# Patient Record
Sex: MALE | Race: BLACK OR AFRICAN AMERICAN | NOT HISPANIC OR LATINO | Employment: FULL TIME | URBAN - METROPOLITAN AREA
[De-identification: names, ages, dates, MRNs, and addresses within clinical notes are randomized per-mention and may not be internally consistent; named-entity substitution may affect disease eponyms.]

---

## 2018-01-04 ENCOUNTER — HOSPITAL ENCOUNTER (EMERGENCY)
Facility: HOSPITAL | Age: 33
Discharge: HOME | End: 2018-01-04
Attending: EMERGENCY MEDICINE

## 2018-01-04 VITALS
RESPIRATION RATE: 18 BRPM | OXYGEN SATURATION: 98 % | HEIGHT: 74 IN | DIASTOLIC BLOOD PRESSURE: 61 MMHG | WEIGHT: 230 LBS | SYSTOLIC BLOOD PRESSURE: 136 MMHG | BODY MASS INDEX: 29.52 KG/M2 | TEMPERATURE: 99 F | HEART RATE: 81 BPM

## 2018-01-04 DIAGNOSIS — L02.91 ABSCESS: Primary | ICD-10-CM

## 2018-01-04 PROCEDURE — 10060 I&D ABSCESS SIMPLE/SINGLE: CPT

## 2018-01-04 PROCEDURE — 25000003 PHARM REV CODE 250: Performed by: PHYSICIAN ASSISTANT

## 2018-01-04 PROCEDURE — 99283 EMERGENCY DEPT VISIT LOW MDM: CPT | Mod: 25

## 2018-01-04 PROCEDURE — 99283 EMERGENCY DEPT VISIT LOW MDM: CPT | Mod: 25,,, | Performed by: PHYSICIAN ASSISTANT

## 2018-01-04 PROCEDURE — 10061 I&D ABSCESS COMP/MULTIPLE: CPT | Mod: ,,, | Performed by: PHYSICIAN ASSISTANT

## 2018-01-04 RX ORDER — DOXYCYCLINE 100 MG/1
100 CAPSULE ORAL 2 TIMES DAILY
Qty: 20 CAPSULE | Refills: 0 | Status: SHIPPED | OUTPATIENT
Start: 2018-01-04 | End: 2018-01-14

## 2018-01-04 RX ORDER — LIDOCAINE HYDROCHLORIDE 10 MG/ML
10 INJECTION INFILTRATION; PERINEURAL
Status: COMPLETED | OUTPATIENT
Start: 2018-01-04 | End: 2018-01-04

## 2018-01-04 RX ORDER — IBUPROFEN 600 MG/1
600 TABLET ORAL EVERY 6 HOURS PRN
Qty: 20 TABLET | Refills: 0 | Status: SHIPPED | OUTPATIENT
Start: 2018-01-04 | End: 2018-09-23

## 2018-01-04 RX ADMIN — LIDOCAINE HYDROCHLORIDE 10 ML: 10 INJECTION, SOLUTION INFILTRATION; PERINEURAL at 06:01

## 2018-01-04 NOTE — ED PROVIDER NOTES
Encounter Date: 2018    SCRIBE #1 NOTE: Leonora FONTANA , am scribing for, and in the presence of,  Dr. Ordoñez . I have scribed the entire note.       History     Chief Complaint   Patient presents with    Abscess     right axilla abscess x 4 days, denies drainage.      Time patient was seen by the provider: 6:13 AM    The patient is a 32 y.o. male with no pertinent medical history who presents to the ED with a complaint of right axilla abscess with onset 4 days ago. Patient denies drainage or fever. Patient has no other complaints at this time.      The history is provided by the patient and medical records.     Review of patient's allergies indicates:  No Known Allergies  History reviewed. No pertinent past medical history.  History reviewed. No pertinent surgical history.  History reviewed. No pertinent family history.  Social History   Substance Use Topics    Smoking status: Never Smoker    Smokeless tobacco: Never Used    Alcohol use No     Review of Systems   Constitutional: Negative for fever.   Skin:        Right axilla abscess.   Negative for drainage.        Physical Exam     Initial Vitals [18 0219]   BP Pulse Resp Temp SpO2   136/61 81 18 98.7 °F (37.1 °C) 98 %      MAP       86         Physical Exam    Nursing note and vitals reviewed.  Constitutional:   Nontoxic   Neck: Neck supple.   Skin:   4x2 cm abscess to the right axilla.         ED Course   I & D - Incision and Drainage  Date/Time: 2018 6:57 AM  Performed by: ALONZO AMBROCIO  Authorized by: DONNIE ORDOÑEZ   Consent Done: Yes  Consent: Verbal consent obtained.  Consent given by: patient  Patient identity confirmed:  and verbally with patient  Location: right axilla.  Anesthesia: local infiltration    Anesthesia:  Local Anesthetic: lidocaine 1% without epinephrine  Anesthetic total: 4 mL  Scalpel size: 11  Incision type: single straight  Complexity: complex  Drainage: pus,  bloody and  purulent  Drainage amount:  moderate  Wound treatment: incision,  deloculation,  wound packed and  expression of material  Packing material: 1/4 in gauze  Patient tolerance: Patient tolerated the procedure well with no immediate complications        Labs Reviewed - No data to display          Medical Decision Making:   History:   Old Medical Records: I decided to obtain old medical records.  Initial Assessment:   Patient with abscess. Will do I&D and discharge.             Scribe Attestation:   Scribe #1: I performed the above scribed service and the documentation accurately describes the services I performed. I attest to the accuracy of the note.            ED Course      Clinical Impression:   The encounter diagnosis was Abscess.    Disposition:   Disposition: Discharged  Condition: Stable                        Saurabh Dunne PA-C  01/04/18 2309

## 2018-01-04 NOTE — ED TRIAGE NOTES
Pt presents to the with complaints of abscess to the under right arm. Pt is awake alert and oriented x4. Pt denies any chest pain sob or nausea. Pt states it has been there for approx 4 days with no drainage.

## 2018-08-19 ENCOUNTER — HOSPITAL ENCOUNTER (EMERGENCY)
Facility: HOSPITAL | Age: 33
Discharge: HOME | End: 2018-08-19
Attending: EMERGENCY MEDICINE

## 2018-08-19 VITALS
OXYGEN SATURATION: 98 % | WEIGHT: 230 LBS | HEIGHT: 74 IN | SYSTOLIC BLOOD PRESSURE: 134 MMHG | RESPIRATION RATE: 15 BRPM | BODY MASS INDEX: 29.52 KG/M2 | HEART RATE: 65 BPM | DIASTOLIC BLOOD PRESSURE: 71 MMHG | TEMPERATURE: 99 F

## 2018-08-19 DIAGNOSIS — M54.50 ACUTE LEFT-SIDED LOW BACK PAIN WITHOUT SCIATICA: Primary | ICD-10-CM

## 2018-08-19 PROCEDURE — 99283 EMERGENCY DEPT VISIT LOW MDM: CPT

## 2018-08-19 PROCEDURE — 99283 EMERGENCY DEPT VISIT LOW MDM: CPT | Mod: ,,, | Performed by: PHYSICIAN ASSISTANT

## 2018-08-19 PROCEDURE — 25000003 PHARM REV CODE 250: Performed by: PHYSICIAN ASSISTANT

## 2018-08-19 RX ORDER — METHOCARBAMOL 750 MG/1
1500 TABLET, FILM COATED ORAL
Status: COMPLETED | OUTPATIENT
Start: 2018-08-19 | End: 2018-08-19

## 2018-08-19 RX ORDER — METHOCARBAMOL 750 MG/1
1500 TABLET, FILM COATED ORAL 3 TIMES DAILY
Qty: 15 TABLET | Refills: 0 | Status: SHIPPED | OUTPATIENT
Start: 2018-08-19 | End: 2018-08-24

## 2018-08-19 RX ORDER — NAPROXEN 500 MG/1
500 TABLET ORAL
Status: COMPLETED | OUTPATIENT
Start: 2018-08-19 | End: 2018-08-19

## 2018-08-19 RX ORDER — NAPROXEN 500 MG/1
500 TABLET ORAL 2 TIMES DAILY WITH MEALS
Qty: 20 TABLET | Refills: 0 | Status: SHIPPED | OUTPATIENT
Start: 2018-08-19 | End: 2018-09-23

## 2018-08-19 RX ADMIN — METHOCARBAMOL 1500 MG: 750 TABLET ORAL at 10:08

## 2018-08-19 RX ADMIN — NAPROXEN 500 MG: 500 TABLET ORAL at 10:08

## 2018-08-20 NOTE — ED PROVIDER NOTES
Encounter Date: 8/19/2018       History     Chief Complaint   Patient presents with    Back Pain     Since Friday. After trying to move a sofa     Patient is a 33-year-old male with no significant past medical history who presents the ED with acute back pain. Patient states Friday night, 2 nights ago, he was lifting a 4-5 seater sofa and developed acute onset of back pain located to his left low side.  He complains of 7/10 pain.  He denies any radiating pain, fever, dysuria, urinary frequency, hematuria, abdominal pain, B/B incontinence, lower extremity paresthesias or weakness, saddle anesthesias, history of IV drug use.  Patient states that he has had back pain before but did not have it prior to his injury. He did not take anything for pain relief.  He took an Uber to the emergency department and will Uber back home.          Review of patient's allergies indicates:  No Known Allergies  History reviewed. No pertinent past medical history.  History reviewed. No pertinent surgical history.  History reviewed. No pertinent family history.  Social History     Tobacco Use    Smoking status: Current Every Day Smoker     Packs/day: 0.50     Types: Cigarettes    Smokeless tobacco: Never Used   Substance Use Topics    Alcohol use: No     Comment: Socially    Drug use: No     Review of Systems   Constitutional: Negative for chills and fever.   HENT: Negative for ear pain and sore throat.    Eyes: Negative for photophobia.   Respiratory: Negative for cough and shortness of breath.    Cardiovascular: Negative for chest pain.   Gastrointestinal: Negative for nausea.   Genitourinary: Negative for dysuria, frequency and hematuria.        No B/B incontinence.   Musculoskeletal: Positive for back pain. Negative for gait problem.   Skin: Negative for rash.   Neurological: Negative for seizures, weakness and numbness.   Hematological: Does not bruise/bleed easily.       Physical Exam     Initial Vitals [08/19/18 2159]   BP  Pulse Resp Temp SpO2   134/71 65 15 98.5 °F (36.9 °C) 98 %      MAP       --         Physical Exam    Vitals reviewed.  Constitutional: He appears well-developed and well-nourished. He is not diaphoretic. No distress.   HENT:   Head: Normocephalic and atraumatic.   Nose: Nose normal.   Eyes: Conjunctivae and EOM are normal.   Neck: Normal range of motion.   Cardiovascular: Normal rate, regular rhythm and normal heart sounds. Exam reveals no friction rub.    No murmur heard.  Pulmonary/Chest: Breath sounds normal. No respiratory distress. He has no wheezes. He has no rales.   Abdominal: Soft. Bowel sounds are normal. He exhibits no distension. There is no tenderness.   Musculoskeletal: Normal range of motion.        Back:    No spinal process tenderness. Full range of motion of lower extremities with strength intact.    Neurological: He is alert and oriented to person, place, and time. He has normal strength. No sensory deficit.   Patient ambulated in exam room without difficulty. Sensations grossly intact.    Skin: Skin is warm and dry. No erythema.   Psychiatric: He has a normal mood and affect. Thought content normal.         ED Course   Procedures  Labs Reviewed - No data to display       Imaging Results    None          Medical Decision Making:   History:   Old Medical Records: I decided to obtain old medical records.       APC / Resident Notes:   Patient is a 33-year-old male that presents the ED with acute left low back pain onset 2 nights ago.    On exam, vital signs stable.  NAD and nontoxic appearing.  No spinous process tenderness.  Tenderness to palpation to the left low back without underlying bony tenderness.  Skin without abnormalities.  No CVA tenderness.  Abdomen soft, nontender.  Full range of motion of lower extremities with strength intact.  Sensation is intact. No abnormality in gait.    I have considered but do not suspect cauda equina syndrome, fractures, dislocations, spinal abscess.   Patient's symptoms are most consistent with back muscle strain without sciatica.  I discussed etiology and self-limiting prognosis with patient of his back pain.  I will give patient naproxen and Robaxin here.  He is taking a taxi home.  I will provided him with a prescription for Robaxin and naproxen.  He is to follow up with PCP if symptoms do not improved.  All questions answered.  Patient is comfortable with plan and stable for discharge. I have reviewed patient's chart and discussed this case with my supervising MD.      10:29 PM  Benita Bryant PA-C  Emergent Department  [facility] #[id number] or #[phone]                   Clinical Impression:   The encounter diagnosis was Acute left-sided low back pain without sciatica.      Disposition:   Disposition: Discharged  Condition: Stable                        Benita Bryant PA-C  08/19/18 3161

## 2018-08-20 NOTE — ED TRIAGE NOTES
Hansel Puente, a 33 y.o. male presents to the ED complaining of bilateral lower back pain after moving furniture on Friday.       Chief Complaint   Patient presents with    Back Pain     Since Friday. After trying to move a sofa     Review of patient's allergies indicates:  No Known Allergies  No past medical history on file.

## 2018-08-20 NOTE — DISCHARGE INSTRUCTIONS
Take nonsteroidal anti-inflammatories (naproxen) 2 times a day which is every 12 hr with meals for pain relief.    Take muscle relaxer (Robaxin) 3 times a day which is every 8 hr as needed for muscle spasms.  Do not take muscle relaxer if you plan on operating heavy machinery or working.    Rest.  Stay active.  Avoid prolonged bed rest.  Consider weight loss and morning back stretches.      Follow up with primary care physician at [facility] or [facility] for further evaluation and management if her symptoms not improve or worsen in 1 week.    No future appointments.    Our goal in the emergency department is to always give you outstanding care and exceptional service. You may receive a survey by mail or e-mail in the next week regarding your experience in our ED. We would greatly appreciate your completing and returning the survey. Your feedback provides us with a way to recognize our staff who give very good care and it helps us learn how to improve when your experience was below our aspiration of excellence.

## 2018-09-23 ENCOUNTER — HOSPITAL ENCOUNTER (EMERGENCY)
Facility: HOSPITAL | Age: 33
Discharge: HOME | End: 2018-09-23
Attending: EMERGENCY MEDICINE

## 2018-09-23 VITALS
SYSTOLIC BLOOD PRESSURE: 127 MMHG | DIASTOLIC BLOOD PRESSURE: 81 MMHG | BODY MASS INDEX: 29.52 KG/M2 | HEART RATE: 78 BPM | TEMPERATURE: 98 F | WEIGHT: 230 LBS | RESPIRATION RATE: 16 BRPM | HEIGHT: 74 IN | OXYGEN SATURATION: 98 %

## 2018-09-23 DIAGNOSIS — S39.012A LUMBAR STRAIN, INITIAL ENCOUNTER: Primary | ICD-10-CM

## 2018-09-23 PROCEDURE — 99283 EMERGENCY DEPT VISIT LOW MDM: CPT | Mod: ,,, | Performed by: EMERGENCY MEDICINE

## 2018-09-23 PROCEDURE — 99283 EMERGENCY DEPT VISIT LOW MDM: CPT

## 2018-09-23 RX ORDER — NAPROXEN 500 MG/1
500 TABLET ORAL 2 TIMES DAILY WITH MEALS
Qty: 60 TABLET | Refills: 0 | Status: SHIPPED | OUTPATIENT
Start: 2018-09-23

## 2018-09-23 RX ORDER — CYCLOBENZAPRINE HCL 10 MG
10 TABLET ORAL 3 TIMES DAILY PRN
Qty: 15 TABLET | Refills: 0 | Status: SHIPPED | OUTPATIENT
Start: 2018-09-23 | End: 2018-09-28

## 2018-09-23 NOTE — ED TRIAGE NOTES
Presents to eR with complaint of low back pain for 1 month after moving heavy furniture.  Patient's name and date of birth checked and is correct.    LOC: The patient is awake, alert, and oriented to place, time, situation. Affect is appropriate.  Speech is appropriate and clear.      APPEARANCE: Patient resting comfortably, reporting palpation, light headedness,  in no acute distress.  Patient is clean and well groomed.     SKIN: The skin is warm and dry; color consistent with ethnicity.  Patient has normal skin turgor and moist mucus membranes.  Skin intact; no breakdown or bruising noted.      MUSCULOSKELETAL: Patient moving upper and lower extremities without difficulty.  Denies weakness.      RESPIRATORY: Airway is open and patent. Respirations spontaneous, even, easy, and non-labored.  Patient has a normal effort and rate.  No accessory muscle use noted. Denies cough.  BS clear.     CARDIAC:  No peripheral edema noted. No complaints of chest pain.       ABDOMEN: Soft and non tender to palpation.  No distention noted.      NEUROLOGIC: Eyes open spontaneously.  Behavior appropriate to situation.  Follows commands; facial expression symmetrical.  Purposeful motor response noted; normal sensation in all extremities.

## 2018-09-23 NOTE — ED PROVIDER NOTES
Encounter Date: 9/23/2018       History     Chief Complaint   Patient presents with    Back Pain     injured back 1 month ago moving furniture. pain returned again yesterday at work. Deneis incontinence.     Patient presents for evaluation of low back pain. [place] said [place] it just while bending over.  About a month ago injured his back also a but at resolved.  He has not taken any analgesic medication.  Localizes pain to the lower lumbar area.  No radicular symptoms. No urinary or fecal incontinence.      The history is provided by the patient.   Back Pain    This is a new problem. The current episode started yesterday. The problem has been unchanged. The pain is present in the lumbar spine. The quality of the pain is described as aching. The pain does not radiate. The symptoms are aggravated by bending. Pertinent negatives include no chest pain, no numbness, no headaches and no weakness. He has tried nothing for the symptoms. The treatment provided moderate relief. Risk factors include obesity.     Review of patient's allergies indicates:  No Known Allergies  History reviewed. No pertinent past medical history.  History reviewed. No pertinent surgical history.  History reviewed. No pertinent family history.  Social History     Tobacco Use    Smoking status: Current Every Day Smoker     Packs/day: 0.50     Types: Cigarettes    Smokeless tobacco: Never Used   Substance Use Topics    Alcohol use: No     Comment: Socially    Drug use: No     Review of Systems   Constitutional: Negative for appetite change and diaphoresis.   HENT: Negative for ear discharge and sinus pain.    Eyes: Negative for visual disturbance.   Respiratory: Negative for chest tightness.    Cardiovascular: Negative for chest pain.   Musculoskeletal: Positive for back pain.   Neurological: Negative for weakness, numbness and headaches.       Physical Exam     Initial Vitals [09/23/18  1042]   BP Pulse Resp Temp SpO2   127/81 78 16 98.1 °F (36.7 °C) 98 %      MAP       --         Physical Exam    Constitutional: He appears well-developed.   HENT:   Head: Normocephalic.   Eyes: Pupils are equal, round, and reactive to light.   Neck: Normal range of motion.   Cardiovascular: Normal rate.   Pulmonary/Chest: Breath sounds normal.   Musculoskeletal:        Lumbar back: He exhibits tenderness.        Back:    He is able to stand on 1 leg.  He has full range of motion with flexion, extension, side bending, and rotation.   Neurological: He is alert.   Reflex Scores:       Patellar reflexes are 2+ on the right side and 2+ on the left side.  Psychiatric: He has a normal mood and affect.         ED Course    The patient was seen and examined.  His pain is consistent with a acute lumbar strain.  He has no radicular symptoms.  We placed on Flexeril as well as naproxen.  Also instructed to use ice 20 min 3 times a day and a work note for today was given.   Procedures  Labs Reviewed - No data to display       Imaging Results    None                               Clinical Impression:   The encounter diagnosis was Lumbar strain, initial encounter.                             Ravindra Mariee DO  09/23/18 1051

## 2020-07-10 ENCOUNTER — HOSPITAL ENCOUNTER (EMERGENCY)
Facility: HOSPITAL | Age: 35
Discharge: HOME | End: 2020-07-10
Attending: EMERGENCY MEDICINE

## 2020-07-10 VITALS
HEIGHT: 74 IN | TEMPERATURE: 99 F | BODY MASS INDEX: 32.08 KG/M2 | WEIGHT: 250 LBS | SYSTOLIC BLOOD PRESSURE: 140 MMHG | DIASTOLIC BLOOD PRESSURE: 86 MMHG | OXYGEN SATURATION: 95 % | HEART RATE: 84 BPM | RESPIRATION RATE: 18 BRPM

## 2020-07-10 DIAGNOSIS — D72.829 LEUKOCYTOSIS, UNSPECIFIED TYPE: ICD-10-CM

## 2020-07-10 DIAGNOSIS — L03.115 CELLULITIS OF RIGHT LOWER EXTREMITY: Primary | ICD-10-CM

## 2020-07-10 DIAGNOSIS — M79.89 RIGHT LEG SWELLING: ICD-10-CM

## 2020-07-10 LAB
ANION GAP SERPL CALC-SCNC: 10 MMOL/L (ref 8–16)
BASOPHILS # BLD AUTO: 0.04 K/UL (ref 0–0.2)
BASOPHILS NFR BLD: 0.3 % (ref 0–1.9)
BUN SERPL-MCNC: 13 MG/DL (ref 6–20)
CALCIUM SERPL-MCNC: 9.4 MG/DL (ref 8.7–10.5)
CHLORIDE SERPL-SCNC: 102 MMOL/L (ref 95–110)
CO2 SERPL-SCNC: 21 MMOL/L (ref 23–29)
CREAT SERPL-MCNC: 1.2 MG/DL (ref 0.5–1.4)
CRP SERPL-MCNC: 76.8 MG/L (ref 0–8.2)
DIFFERENTIAL METHOD: ABNORMAL
EOSINOPHIL # BLD AUTO: 0.1 K/UL (ref 0–0.5)
EOSINOPHIL NFR BLD: 0.7 % (ref 0–8)
ERYTHROCYTE [DISTWIDTH] IN BLOOD BY AUTOMATED COUNT: 13.6 % (ref 11.5–14.5)
ERYTHROCYTE [SEDIMENTATION RATE] IN BLOOD BY WESTERGREN METHOD: 59 MM/HR (ref 0–23)
EST. GFR  (AFRICAN AMERICAN): >60 ML/MIN/1.73 M^2
EST. GFR  (NON AFRICAN AMERICAN): >60 ML/MIN/1.73 M^2
GLUCOSE SERPL-MCNC: 125 MG/DL (ref 70–110)
HCT VFR BLD AUTO: 38.5 % (ref 40–54)
HGB BLD-MCNC: 13.9 G/DL (ref 14–18)
IMM GRANULOCYTES # BLD AUTO: 0.05 K/UL (ref 0–0.04)
IMM GRANULOCYTES NFR BLD AUTO: 0.4 % (ref 0–0.5)
LACTATE SERPL-SCNC: 1.3 MMOL/L (ref 0.5–2.2)
LYMPHOCYTES # BLD AUTO: 1.8 K/UL (ref 1–4.8)
LYMPHOCYTES NFR BLD: 13 % (ref 18–48)
MCH RBC QN AUTO: 29.4 PG (ref 27–31)
MCHC RBC AUTO-ENTMCNC: 36.1 G/DL (ref 32–36)
MCV RBC AUTO: 82 FL (ref 82–98)
MONOCYTES # BLD AUTO: 1.4 K/UL (ref 0.3–1)
MONOCYTES NFR BLD: 10.6 % (ref 4–15)
NEUTROPHILS # BLD AUTO: 10.2 K/UL (ref 1.8–7.7)
NEUTROPHILS NFR BLD: 75 % (ref 38–73)
NRBC BLD-RTO: 0 /100 WBC
PLATELET # BLD AUTO: 327 K/UL (ref 150–350)
PMV BLD AUTO: 10.5 FL (ref 9.2–12.9)
POTASSIUM SERPL-SCNC: 4 MMOL/L (ref 3.5–5.1)
RBC # BLD AUTO: 4.72 M/UL (ref 4.6–6.2)
SODIUM SERPL-SCNC: 133 MMOL/L (ref 136–145)
WBC # BLD AUTO: 13.53 K/UL (ref 3.9–12.7)

## 2020-07-10 PROCEDURE — 80048 BASIC METABOLIC PNL TOTAL CA: CPT

## 2020-07-10 PROCEDURE — 25000003 PHARM REV CODE 250: Performed by: EMERGENCY MEDICINE

## 2020-07-10 PROCEDURE — 99285 PR EMERGENCY DEPT VISIT,LEVEL V: ICD-10-PCS | Mod: ,,, | Performed by: EMERGENCY MEDICINE

## 2020-07-10 PROCEDURE — 99285 EMERGENCY DEPT VISIT HI MDM: CPT | Mod: 25

## 2020-07-10 PROCEDURE — 85652 RBC SED RATE AUTOMATED: CPT

## 2020-07-10 PROCEDURE — 96365 THER/PROPH/DIAG IV INF INIT: CPT

## 2020-07-10 PROCEDURE — 86140 C-REACTIVE PROTEIN: CPT

## 2020-07-10 PROCEDURE — 99285 EMERGENCY DEPT VISIT HI MDM: CPT | Mod: ,,, | Performed by: EMERGENCY MEDICINE

## 2020-07-10 PROCEDURE — 85025 COMPLETE CBC W/AUTO DIFF WBC: CPT

## 2020-07-10 PROCEDURE — 96361 HYDRATE IV INFUSION ADD-ON: CPT

## 2020-07-10 PROCEDURE — 87040 BLOOD CULTURE FOR BACTERIA: CPT

## 2020-07-10 PROCEDURE — 83605 ASSAY OF LACTIC ACID: CPT

## 2020-07-10 RX ORDER — HYDROCODONE BITARTRATE AND ACETAMINOPHEN 5; 325 MG/1; MG/1
1 TABLET ORAL EVERY 6 HOURS PRN
Qty: 12 TABLET | Refills: 0 | Status: SHIPPED | OUTPATIENT
Start: 2020-07-10

## 2020-07-10 RX ORDER — CLINDAMYCIN PHOSPHATE 600 MG/50ML
600 INJECTION, SOLUTION INTRAVENOUS
Status: COMPLETED | OUTPATIENT
Start: 2020-07-10 | End: 2020-07-10

## 2020-07-10 RX ORDER — HYDROCODONE BITARTRATE AND ACETAMINOPHEN 5; 325 MG/1; MG/1
1 TABLET ORAL
Status: COMPLETED | OUTPATIENT
Start: 2020-07-10 | End: 2020-07-10

## 2020-07-10 RX ORDER — CLINDAMYCIN HYDROCHLORIDE 150 MG/1
450 CAPSULE ORAL EVERY 8 HOURS
Qty: 63 CAPSULE | Refills: 0 | Status: SHIPPED | OUTPATIENT
Start: 2020-07-10 | End: 2020-07-17

## 2020-07-10 RX ADMIN — SODIUM CHLORIDE 1000 ML: 0.9 INJECTION, SOLUTION INTRAVENOUS at 02:07

## 2020-07-10 RX ADMIN — HYDROCODONE BITARTRATE AND ACETAMINOPHEN 1 TABLET: 5; 325 TABLET ORAL at 02:07

## 2020-07-10 RX ADMIN — CLINDAMYCIN IN 5 PERCENT DEXTROSE 600 MG: 12 INJECTION, SOLUTION INTRAVENOUS at 02:07

## 2020-07-10 NOTE — ED NOTES
Patient identifiers verified and correct for MR Puente  C/C: redness and swelling RLE SEE NN  APPEARANCE: awake and alert in NAD.  SKIN: warm, dry redness mid lower calf to ankle  MUSCULOSKELETAL: Patient moving all extremities spontaneously, no obvious swelling or deformities noted. Ambulates independently.  RESPIRATORY: Denies shortness of breath.Respirations unlabored.   CARDIAC: Denies CP, 2+ distal pulses; 1-2+ peripheral edema  ABDOMEN: S/ND/NT, Denies nausea  : voids spontaneously, denies difficulty  Neurologic: AAO x 4; follows commands equal strength in all extremities; denies numbness/tingling. Denies dizziness Denies weakness, positive palp pedal pulses,

## 2020-07-10 NOTE — ED TRIAGE NOTES
"Patient states redness and swelling to RLE x 1 month, has been worse in the past. Taking  OTC "water pills" and elevating above his heart. No antibiotics, Temp to 100.5  "

## 2020-07-10 NOTE — ED PROVIDER NOTES
"Encounter Date: 7/10/2020    SCRIBE #1 NOTE: I, Priscilla Cai, am scribing for, and in the presence of,  Mina Boyd MD. I have scribed the following portions of the note - Other sections scribed: HPI, ROS, PE, MDM.       History     Chief Complaint   Patient presents with    Leg Swelling     R leg swelling. Redness and swelling noted x1 month     The history is provided by the patient and medical records. No  was used.      Mr. Puente is a 34 y.o. male with no significant medical history here today with a complaint of swelling in his right leg for 1 month. He has had this issue before in the same leg about 7 years ago. He states this is the first time he has sought care for the current complaint. The patient says he tried elevating the leg and taking over the counter diuretics, however these remedies did not reduce the swelling. He has not taken any medications for pain. He had a "light" fever 2-3 days ago but does not currently have a fever.  Has noticed a redness to the leg with increased warmth and pain.  The patient denies history of DVT/PE. He has had no surgeries on his right leg. He denies cough, chest pain, SOB, abdominal pain, nausea, or vomiting. The patient has no known drug allergies.      Review of patient's allergies indicates:  No Known Allergies  History reviewed. No pertinent past medical history.  History reviewed. No pertinent surgical history.  History reviewed. No pertinent family history.  Social History     Tobacco Use    Smoking status: Current Every Day Smoker     Packs/day: 0.50     Types: Cigarettes    Smokeless tobacco: Never Used   Substance Use Topics    Alcohol use: No     Comment: Socially    Drug use: No     Review of Systems   Constitutional: Negative for fever.   HENT: Negative for sore throat.    Respiratory: Negative for shortness of breath.    Cardiovascular: Positive for leg swelling (Right lower leg and foot). Negative for chest pain. "   Gastrointestinal: Negative for abdominal pain, nausea and vomiting.   Genitourinary: Negative for dysuria.   Musculoskeletal: Negative for back pain.   Skin: Positive for color change. Negative for rash.   Neurological: Negative for weakness.   Hematological: Does not bruise/bleed easily.       Physical Exam     Initial Vitals [07/10/20 1341]   BP Pulse Resp Temp SpO2   (!) 152/78 106 15 99.3 °F (37.4 °C) 95 %      MAP       --         Physical Exam    Nursing note and vitals reviewed.  Constitutional: He appears well-developed and well-nourished. He is not diaphoretic. No distress.   HENT:   Head: Normocephalic and atraumatic.   Right Ear: External ear normal.   Left Ear: External ear normal.   Neck: Neck supple.   Cardiovascular: Regular rhythm, normal heart sounds and intact distal pulses. Tachycardia present.    Pulmonary/Chest: Breath sounds normal. No respiratory distress. He has no wheezes. He has no rhonchi. He has no rales.   Abdominal: Soft. He exhibits no distension. There is no abdominal tenderness. There is no rebound and no guarding.   Musculoskeletal:      Comments: Pitting edema to right lower extremity to mid shin with tenderness to palpation.  No edema to LLE.   Neurological: He is alert and oriented to person, place, and time. He has normal strength. No sensory deficit. GCS score is 15. GCS eye subscore is 4. GCS verbal subscore is 5. GCS motor subscore is 6.   Skin: Skin is warm. Capillary refill takes less than 2 seconds. No rash noted.   Erythema over distal portion of right lower extremity.  No crepitus.  See image below.   Psychiatric: He has a normal mood and affect.                  ED Course   Procedures  Labs Reviewed   CBC W/ AUTO DIFFERENTIAL - Abnormal; Notable for the following components:       Result Value    WBC 13.53 (*)     Hemoglobin 13.9 (*)     Hematocrit 38.5 (*)     Mean Corpuscular Hemoglobin Conc 36.1 (*)     Gran # (ANC) 10.2 (*)     Immature Grans (Abs) 0.05 (*)      Mono # 1.4 (*)     Gran% 75.0 (*)     Lymph% 13.0 (*)     All other components within normal limits   BASIC METABOLIC PANEL - Abnormal; Notable for the following components:    Sodium 133 (*)     CO2 21 (*)     Glucose 125 (*)     All other components within normal limits   SEDIMENTATION RATE - Abnormal; Notable for the following components:    Sed Rate 59 (*)     All other components within normal limits   C-REACTIVE PROTEIN - Abnormal; Notable for the following components:    CRP 76.8 (*)     All other components within normal limits   CULTURE, BLOOD   CULTURE, BLOOD   LACTIC ACID, PLASMA          Imaging Results          X-Ray Tibia Fibula 2 View Right (Final result)  Result time 07/10/20 16:14:38    Final result by Aman Heck III, MD (07/10/20 16:14:38)                 Narrative:    EXAMINATION:  XR TIBIA FIBULA 2 VIEW RIGHT    CLINICAL HISTORY:  Other specified soft tissue disorders    FINDINGS:  Two views: No fracture dislocation bone destruction seen.      Electronically signed by: Aman Heck MD  Date:    07/10/2020  Time:    16:14                             US Lower Extremity Veins Bilateral (Final result)  Result time 07/10/20 16:12:12    Final result by Janey Forde MD (07/10/20 16:12:12)                 Impression:      No evidence of deep venous thrombosis in either lower extremity.      Electronically signed by: Janey Forde MD  Date:    07/10/2020  Time:    16:12             Narrative:    EXAMINATION:  US LOWER EXTREMITY VEINS BILATERAL    CLINICAL HISTORY:  Other specified soft tissue disorders    TECHNIQUE:  Duplex and color flow Doppler and dynamic compression was performed of the bilateral lower extremity veins was performed.    COMPARISON:  None    FINDINGS:  Right thigh veins: The common femoral, femoral, popliteal, upper greater saphenous, and deep femoral veins are patent and free of thrombus. The veins are normally compressible and have normal phasic flow and augmentation  response.    Right calf veins: The visualized calf veins are patent.    Left thigh veins: The common femoral, femoral, popliteal, upper greater saphenous, and deep femoral veins are patent and free of thrombus. The veins are normally compressible and have normal phasic flow and augmentation response.    Left calf veins: The visualized calf veins are patent.    Miscellaneous: None                                 Medical Decision Making:   History:   Old Medical Records: I decided to obtain old medical records.  Old Records Summarized: other records.       <> Summary of Records: Seen in the ED in the past for minor complaints  Independently Interpreted Test(s):   I have ordered and independently interpreted X-rays - see summary below.       <> Summary of X-Ray Reading(s): No acute fracture or dislocation on my read.  No subcutaneous free air.  Clinical Tests:   Lab Tests: Ordered and Reviewed  Radiological Study: Ordered and Reviewed  ED Management:  Mildly tachycardic.  Afebrile.  Here with right lower leg swelling.  Clinically is consistent with cellulitis.  However will need to rule out DVT given duration symptoms.  Do not suspect any kind of necrotizing fasciitis or rapidly ascending infection.  Will obtain x-ray right tib-fib, CBC, CMP, lactate, ESR, CRP, blood culture, ultrasound bilateral lower extremity.  Dose of IV clindamycin given.  1 L normal saline bolus given.  Norco given for pain.    Labs reviewed.  Significant for white count 13 K. Has mild elevations and ESR and CRP. Lactate normal.  X-ray negative.  DVT study negative.    Patient seems very reliable for outpatient management of this right lower extremity cellulitis.  Discharge with Rx for few Norco for pain.  Crutches provided.  Rx for clindamycin provided as well.  Advised follow-up PCP in few days for wound check.  Vitals improved prior to discharge.    Stable for discharge this time.  Strict return precautions discussed            Scribe  Attestation:   Scribe #1: I performed the above scribed service and the documentation accurately describes the services I performed. I attest to the accuracy of the note.                          Clinical Impression:       ICD-10-CM ICD-9-CM   1. Cellulitis of right lower extremity  L03.115 682.6   2. Right leg swelling  M79.89 729.81   3. Leukocytosis, unspecified type  D72.829 288.60             ED Disposition Condition    Discharge Stable        ED Prescriptions     Medication Sig Dispense Start Date End Date Auth. Provider    HYDROcodone-acetaminophen (NORCO) 5-325 mg per tablet Take 1 tablet by mouth every 6 (six) hours as needed. 12 tablet 7/10/2020  Mina Boyd MD    clindamycin (CLEOCIN) 150 MG capsule Take 3 capsules (450 mg total) by mouth every 8 (eight) hours. for 7 days 63 capsule 7/10/2020 7/17/2020 Mina Boyd MD        Follow-up Information     Follow up With Specialties Details Why Contact Info    Primary care physician  In 3 days For wound re-check     [facility]  If symptoms worsen [address] [id number] [phone]                                     Mina Boyd MD  07/11/20 2542

## 2020-07-10 NOTE — ED NOTES
Pt returned from ultrasound; reporting total relief in pain since receiving medication. Pt updated on wait for test results; denies needs or complaints at this time.  Pt encouraged to alert nursing staff for assistance with ambulation; verbalizes understanding.  Will continue to monitor pt.

## 2020-07-15 LAB
BACTERIA BLD CULT: NORMAL
BACTERIA BLD CULT: NORMAL

## 2020-11-27 ENCOUNTER — HOSPITAL ENCOUNTER (EMERGENCY)
Facility: HOSPITAL | Age: 35
Discharge: HOME | End: 2020-11-27
Attending: EMERGENCY MEDICINE

## 2020-11-27 VITALS
OXYGEN SATURATION: 97 % | BODY MASS INDEX: 32.08 KG/M2 | WEIGHT: 250 LBS | RESPIRATION RATE: 18 BRPM | SYSTOLIC BLOOD PRESSURE: 139 MMHG | TEMPERATURE: 99 F | DIASTOLIC BLOOD PRESSURE: 67 MMHG | HEIGHT: 74 IN | HEART RATE: 76 BPM

## 2020-11-27 DIAGNOSIS — M79.672 LEFT FOOT PAIN: Primary | ICD-10-CM

## 2020-11-27 PROCEDURE — 99284 EMERGENCY DEPT VISIT MOD MDM: CPT | Mod: ,,, | Performed by: PHYSICIAN ASSISTANT

## 2020-11-27 PROCEDURE — 99284 PR EMERGENCY DEPT VISIT,LEVEL IV: ICD-10-PCS | Mod: ,,, | Performed by: PHYSICIAN ASSISTANT

## 2020-11-27 PROCEDURE — 99283 EMERGENCY DEPT VISIT LOW MDM: CPT

## 2020-11-27 RX ORDER — NAPROXEN 500 MG/1
500 TABLET ORAL 2 TIMES DAILY WITH MEALS
Qty: 20 TABLET | Refills: 0 | Status: SHIPPED | OUTPATIENT
Start: 2020-11-27

## 2020-11-27 NOTE — ED PROVIDER NOTES
Encounter Date: 11/27/2020       History     Chief Complaint   Patient presents with    Foot Problem     bump under L foot     35 year old  male with no medical history presents to the ED complaining of left foot pain for the past week.  He reports that there is a bump on the plantar aspect of his left foot that becomes more painful after standing for long periods of time at work.  He describes the pain as 8/10 throbbing he has not taken any over-the-counter pain medication prior to his arrival.  He denies any trauma.  He denies any past surgical history of the foot.  He denies fever, chills, chest pain, shortness of breath, swelling, paresthesias, numbness.    The history is provided by the patient.     Review of patient's allergies indicates:  No Known Allergies  No past medical history on file.  No past surgical history on file.  No family history on file.  Social History     Tobacco Use    Smoking status: Current Every Day Smoker     Packs/day: 0.50     Types: Cigarettes    Smokeless tobacco: Never Used   Substance Use Topics    Alcohol use: No     Comment: Socially    Drug use: No     Review of Systems   Constitutional: Negative for chills and fever.   Respiratory: Negative for cough and shortness of breath.    Cardiovascular: Negative for chest pain.   Gastrointestinal: Negative for abdominal pain, nausea and vomiting.   Genitourinary: Negative for dysuria.   Musculoskeletal: Positive for arthralgias and myalgias.   Skin: Negative for rash.   Neurological: Negative for numbness and headaches.   Psychiatric/Behavioral: Negative for confusion.       Physical Exam     Initial Vitals [11/27/20 1425]   BP Pulse Resp Temp SpO2   139/67 76 18 98.6 °F (37 °C) 97 %      MAP       --         Physical Exam    Nursing note and vitals reviewed.  Constitutional: He appears well-developed and well-nourished. He is not diaphoretic. No distress.   HENT:   Head: Normocephalic and atraumatic.   Neck:  Normal range of motion. Neck supple.   Cardiovascular: Normal rate, regular rhythm and normal heart sounds. Exam reveals no gallop and no friction rub.    No murmur heard.  Pulmonary/Chest: Breath sounds normal. He has no wheezes. He has no rhonchi. He has no rales.   Musculoskeletal: Normal range of motion.        Feet:       Comments: L pedal pulse 2+   Neurological: He is alert and oriented to person, place, and time.   Skin: Skin is warm and dry. No rash noted. No erythema.   Psychiatric: He has a normal mood and affect.         ED Course   Procedures  Labs Reviewed - No data to display       Imaging Results    None          Medical Decision Making:   History:   Old Medical Records: I decided to obtain old medical records.       APC / Resident Notes:   35 year old  male with no medical history presents to the ED complaining of left foot pain for the past week.  Vital signs stable.  Regular rate and rhythm.  There is an area of tenderness noted to the left plantar foot.  There is no fluctuance, bleeding, drainage, cellulitis appreciated.  Normal sensation to the foot.  Left pedal pulse 2 +.  He denies any trauma.  I do not suspect fracture or dislocation.  I do not feel that needs any labs or imaging.  Stable for discharge.    He was discharged with a prescription for naproxen.  He will follow up with his podiatrist.  Strict ED return precautions given.  All of the patient's questions were answered.  I reviewed the patient's chart.                          Clinical Impression:       ICD-10-CM ICD-9-CM   1. Left foot pain  M79.672 729.5                      Disposition:   Disposition: Discharged  Condition: Stable     ED Disposition Condition    Discharge Stable        ED Prescriptions     Medication Sig Dispense Start Date End Date Auth. Provider    naproxen (NAPROSYN) 500 MG tablet Take 1 tablet (500 mg total) by mouth 2 (two) times daily with meals. 20 tablet 11/27/2020  Christel Aguilar PA-C         Follow-up Information     Follow up With Specialties Details Why Contact Info Additional Information    [id number]   [address]  [facility] [address] [phone] [facility] - Main Building, 5th Floor Please park in [facility] and use Atrium elevator                                       Christel Aguilar PA-C  11/27/20 2022

## 2020-11-27 NOTE — ED TRIAGE NOTES
Patient comes into ER with complaints of bump on the bottom of his left foot for the past week. Patient states that this bump has been preventing him from working.

## 2020-11-27 NOTE — Clinical Note
"Hansel Helms (Shawn)son was seen and treated in our emergency department on 11/27/2020.  He may return to work on 11/29/2020.       If you have any questions or concerns, please don't hesitate to call.      Christel Aguilar PA-C"

## 2022-03-26 ENCOUNTER — HOSPITAL ENCOUNTER (EMERGENCY)
Facility: HOSPITAL | Age: 37
Discharge: HOME | End: 2022-03-27
Attending: EMERGENCY MEDICINE

## 2022-03-26 DIAGNOSIS — L72.9 CUTANEOUS CYST: Primary | ICD-10-CM

## 2022-03-26 LAB
ALBUMIN SERPL BCP-MCNC: 3.9 G/DL (ref 3.5–5.2)
ALP SERPL-CCNC: 65 U/L (ref 55–135)
ALT SERPL W/O P-5'-P-CCNC: 27 U/L (ref 10–44)
ANION GAP SERPL CALC-SCNC: 8 MMOL/L (ref 8–16)
AST SERPL-CCNC: 15 U/L (ref 10–40)
BILIRUB SERPL-MCNC: 0.2 MG/DL (ref 0.1–1)
BUN SERPL-MCNC: 16 MG/DL (ref 6–20)
BUN SERPL-MCNC: 19 MG/DL (ref 6–30)
CALCIUM SERPL-MCNC: 10 MG/DL (ref 8.7–10.5)
CHLORIDE SERPL-SCNC: 103 MMOL/L (ref 95–110)
CHLORIDE SERPL-SCNC: 104 MMOL/L (ref 95–110)
CO2 SERPL-SCNC: 26 MMOL/L (ref 23–29)
CREAT SERPL-MCNC: 1 MG/DL (ref 0.5–1.4)
CREAT SERPL-MCNC: 1 MG/DL (ref 0.5–1.4)
EST. GFR  (AFRICAN AMERICAN): >60 ML/MIN/1.73 M^2
EST. GFR  (NON AFRICAN AMERICAN): >60 ML/MIN/1.73 M^2
GLUCOSE SERPL-MCNC: 124 MG/DL (ref 70–110)
GLUCOSE SERPL-MCNC: 125 MG/DL (ref 70–110)
HCT VFR BLD CALC: 41 %PCV (ref 36–54)
POC IONIZED CALCIUM: 1.2 MMOL/L (ref 1.06–1.42)
POC TCO2 (MEASURED): 25 MMOL/L (ref 23–29)
POTASSIUM BLD-SCNC: 4.4 MMOL/L (ref 3.5–5.1)
POTASSIUM SERPL-SCNC: 4.4 MMOL/L (ref 3.5–5.1)
PROT SERPL-MCNC: 7.8 G/DL (ref 6–8.4)
SAMPLE: ABNORMAL
SODIUM BLD-SCNC: 139 MMOL/L (ref 136–145)
SODIUM SERPL-SCNC: 137 MMOL/L (ref 136–145)

## 2022-03-26 PROCEDURE — 80047 BASIC METABLC PNL IONIZED CA: CPT

## 2022-03-26 PROCEDURE — 80053 COMPREHEN METABOLIC PANEL: CPT | Performed by: EMERGENCY MEDICINE

## 2022-03-26 PROCEDURE — 99282 EMERGENCY DEPT VISIT SF MDM: CPT | Mod: ,,, | Performed by: EMERGENCY MEDICINE

## 2022-03-26 PROCEDURE — 86703 HIV-1/HIV-2 1 RESULT ANTBDY: CPT | Performed by: EMERGENCY MEDICINE

## 2022-03-26 PROCEDURE — 99282 PR EMERGENCY DEPT VISIT,LEVEL II: ICD-10-PCS | Mod: ,,, | Performed by: EMERGENCY MEDICINE

## 2022-03-26 PROCEDURE — 99285 EMERGENCY DEPT VISIT HI MDM: CPT | Mod: 25

## 2022-03-26 PROCEDURE — 85025 COMPLETE CBC W/AUTO DIFF WBC: CPT | Performed by: EMERGENCY MEDICINE

## 2022-03-26 RX ADMIN — IOHEXOL 100 ML: 350 INJECTION, SOLUTION INTRAVENOUS at 11:03

## 2022-03-27 VITALS
SYSTOLIC BLOOD PRESSURE: 131 MMHG | WEIGHT: 230 LBS | OXYGEN SATURATION: 97 % | DIASTOLIC BLOOD PRESSURE: 81 MMHG | HEART RATE: 74 BPM | BODY MASS INDEX: 29.53 KG/M2 | TEMPERATURE: 98 F | RESPIRATION RATE: 18 BRPM

## 2022-03-27 LAB
BASOPHILS # BLD AUTO: 0.07 K/UL (ref 0–0.2)
BASOPHILS NFR BLD: 0.6 % (ref 0–1.9)
DIFFERENTIAL METHOD: ABNORMAL
EOSINOPHIL # BLD AUTO: 0.3 K/UL (ref 0–0.5)
EOSINOPHIL NFR BLD: 2.4 % (ref 0–8)
ERYTHROCYTE [DISTWIDTH] IN BLOOD BY AUTOMATED COUNT: 13.7 % (ref 11.5–14.5)
HCT VFR BLD AUTO: 41 % (ref 40–54)
HGB BLD-MCNC: 14.4 G/DL (ref 14–18)
IMM GRANULOCYTES # BLD AUTO: 0.04 K/UL (ref 0–0.04)
IMM GRANULOCYTES NFR BLD AUTO: 0.4 % (ref 0–0.5)
LYMPHOCYTES # BLD AUTO: 3.8 K/UL (ref 1–4.8)
LYMPHOCYTES NFR BLD: 34.3 % (ref 18–48)
MCH RBC QN AUTO: 28.9 PG (ref 27–31)
MCHC RBC AUTO-ENTMCNC: 35.1 G/DL (ref 32–36)
MCV RBC AUTO: 82 FL (ref 82–98)
MONOCYTES # BLD AUTO: 1.1 K/UL (ref 0.3–1)
MONOCYTES NFR BLD: 9.7 % (ref 4–15)
NEUTROPHILS # BLD AUTO: 5.8 K/UL (ref 1.8–7.7)
NEUTROPHILS NFR BLD: 52.6 % (ref 38–73)
NRBC BLD-RTO: 0 /100 WBC
PLATELET # BLD AUTO: 361 K/UL (ref 150–450)
PMV BLD AUTO: 11 FL (ref 9.2–12.9)
RBC # BLD AUTO: 4.98 M/UL (ref 4.6–6.2)
WBC # BLD AUTO: 10.98 K/UL (ref 3.9–12.7)

## 2022-03-27 PROCEDURE — 25500020 PHARM REV CODE 255: Performed by: EMERGENCY MEDICINE

## 2022-03-27 NOTE — ED NOTES
I-STAT Chem-8+ Results:   Value Reference Range   Sodium 139 136-145 mmol/L   Potassium  4.4 3.5-5.1 mmol/L   Chloride 104  mmol/L   Ionized Calcium 1.20 1.06-1.42 mmol/L   CO2 (measured) 25 23-29 mmol/L   Glucose 125  mg/dL   BUN 19 6-30 mg/dL   Creatinine 1.0 0.5-1.4 mg/dL   Hematocrit 41 36-54%

## 2022-03-27 NOTE — ED TRIAGE NOTES
Hansel Puente, a 36 y.o. male presents to the ED w/ complaint of     Triage note:  Chief Complaint   Patient presents with    Mass     Pt c/o of mass to R lower jaw x6-8 months. Pt denies any dental pain, SOB, difficulty swallowing,fevers, or chills.     Review of patient's allergies indicates:  No Known Allergies  No past medical history on file.     Patient identifiers for Hansel Puente checked and correct.    LOC: The patient is awake, alert and aware of environment with an appropriate affect, the patient is oriented x 4 and speaking appropriately.    APPEARANCE: Patient resting comfortably and in no acute distress, patient is clean and well groomed, patient's clothing is properly fastened.    SKIN: The skin is warm and dry, color consistent with ethnicity, patient has normal skin turgor and moist mucus membranes, skin intact, no breakdown or bruising noted.    MUSCULOSKELETAL: Patient moving all extremities well, no obvious swelling or deformities noted.    RESPIRATORY: Airway is open and patent, respirations are spontaneous and even, patient has a normal effort and rate.    CARDIAC: Patient has a normal rate and rhythm, no periphreal edema noted, capillary refill < 3 seconds. Normal +2 pedal pulses present.    ABDOMEN: Soft and non tender to palpation, no distention noted.    NEUROLOGIC: Eyes open spontaneously, PERRL, behavior appropriate to situation, follows commands, facial expression symmetrical, bilateral hand grasp equal and even, purposeful motor response noted, normal sensation in all extremities.     Allergies reported: Review of patient's allergies indicates:  No Known Allergies

## 2022-03-27 NOTE — DISCHARGE INSTRUCTIONS
You were seen emergency department for a facial cyst.  Please follow-up Plastic surgery for further evaluation and removal.  Return emergency department for any concerning symptoms.       Imaging Results              CT Maxillofacial With Contrast (Final result)  Result time 03/27/22 00:03:23      Final result by Red Kelley MD (03/27/22 00:03:23)                   Impression:      Small 1.9 cm minimally complex subcutaneous cystic focus in the lateral right face.  See above comments recommend follow-up.      Electronically signed by: Red Kelley  Date:    03/27/2022  Time:    00:03               Narrative:    EXAMINATION:  CT MAXILLOFACIAL WITH CONTRAST    CLINICAL HISTORY:  Sublingual/submandibular abscess;    TECHNIQUE:  Low-dose axial images through the face after the IV administration of 100 cc Omnipaque 350 intravenous contrast.  Sagittal coronal reconstructions.    COMPARISON:  None    FINDINGS:  No acute facial fractures.  Nasal bones are intact.  Zygomatic arches are intact.  Orbits are intact the mandible is intact.    Mild mucosal thickening in the maxillary and ethmoid sinuses.    Mastoid air cells are clear.    Small complex 1.9 cm fluid collection laterally on the right superficial to the right masseter and parotid gland on axial 120.  This could represent a small cyst associated with the skin.  Small abscess is not completely excluded.    No significant adenopathy.  Salivary glands appear within normal limits.

## 2022-03-27 NOTE — ED PROVIDER NOTES
Encounter Date: 3/26/2022    SCRIBE #1 NOTE: I, Jay Maradiaga, am scribing for, and in the presence of,  Elizabeth Trevino MD. I have scribed the following portions of the note - Other sections scribed: HPI.      History     Chief Complaint   Patient presents with    Mass     Pt c/o of mass to R lower jaw x6-8 months. Pt denies any dental pain, SOB, difficulty swallowing,fevers, or chills.     Time patient was seen by the provider: 11:00 PM      The patient is a 36 y.o. male with PMH 1 PPD smoker who presents to the ED with a complaint of right-sided facial mass with onset 6-8 months ago. Patient notes that he developed a mass on the right side of his jaw 6-8 months ago. He notes that the mass developed suddenly overnight and it has gradually grown in size over time. Patient states that there is no associated physical discomfort with the mass but he has emotional discomfort when he looks at it in the mirror.  No change in mass with eating or swallowing.  He denies having any symptoms of fever, chills, changes in appetite, or toothache, no changes in salivation. Patient says that he has not seen a physician for this issue previously.    The history is provided by the patient, the spouse and medical records. No  was used.     Review of patient's allergies indicates:  No Known Allergies  History reviewed. No pertinent past medical history.  History reviewed. No pertinent surgical history.  History reviewed. No pertinent family history.  Social History     Tobacco Use    Smoking status: Current Every Day Smoker     Packs/day: 0.50     Types: Cigarettes    Smokeless tobacco: Never Used   Substance Use Topics    Alcohol use: No     Comment: Socially    Drug use: No     Review of Systems   Constitutional: Negative for appetite change, chills, fatigue, fever and unexpected weight change.   HENT: Positive for facial swelling. Negative for dental problem, drooling, ear discharge, ear pain, hearing loss,  mouth sores, rhinorrhea, sinus pressure, sinus pain, sore throat, tinnitus, trouble swallowing and voice change.    Respiratory: Negative for shortness of breath.    Gastrointestinal: Negative for nausea.   Musculoskeletal: Negative for neck pain and neck stiffness.   Skin: Negative for color change and rash.   Allergic/Immunologic: Negative for immunocompromised state.   Neurological: Negative for headaches.   Hematological: Does not bruise/bleed easily.       Physical Exam     Initial Vitals [03/26/22 2237]   BP Pulse Resp Temp SpO2   127/76 75 16 97.9 °F (36.6 °C) 98 %      MAP       --         Physical Exam    Nursing note and vitals reviewed.  Constitutional: He appears well-developed and well-nourished. He is not diaphoretic. No distress.   HENT:   Head: Normocephalic and atraumatic.   Nose: Nose normal.   2 cm firm nontender mobile mass over right preauricular region   Eyes: Conjunctivae and EOM are normal. Pupils are equal, round, and reactive to light. No scleral icterus.   Neck: No thyromegaly present. No tracheal deviation present. No JVD present.   Normal range of motion.  Cardiovascular: Normal rate and regular rhythm.   Pulmonary/Chest: Breath sounds normal. No stridor. No respiratory distress.   Musculoskeletal:         General: Normal range of motion.      Cervical back: Normal range of motion.     Lymphadenopathy:     He has no cervical adenopathy.   Neurological: He is alert and oriented to person, place, and time. He has normal strength.   Skin: Skin is warm and dry. Capillary refill takes less than 2 seconds. No pallor.   Psychiatric: He has a normal mood and affect. His behavior is normal. Judgment and thought content normal.         ED Course   Procedures  Labs Reviewed   COMPREHENSIVE METABOLIC PANEL - Abnormal; Notable for the following components:       Result Value    Glucose 124 (*)     All other components within normal limits   CBC W/ AUTO DIFFERENTIAL - Abnormal; Notable for the  following components:    Mono # 1.1 (*)     All other components within normal limits   ISTAT PROCEDURE - Abnormal; Notable for the following components:    POC Glucose 125 (*)     All other components within normal limits   RAPID HIV          Imaging Results          CT Maxillofacial With Contrast (Final result)  Result time 03/27/22 00:03:23    Final result by Red Kelley MD (03/27/22 00:03:23)                 Impression:      Small 1.9 cm minimally complex subcutaneous cystic focus in the lateral right face.  See above comments recommend follow-up.      Electronically signed by: Red Kelley  Date:    03/27/2022  Time:    00:03             Narrative:    EXAMINATION:  CT MAXILLOFACIAL WITH CONTRAST    CLINICAL HISTORY:  Sublingual/submandibular abscess;    TECHNIQUE:  Low-dose axial images through the face after the IV administration of 100 cc Omnipaque 350 intravenous contrast.  Sagittal coronal reconstructions.    COMPARISON:  None    FINDINGS:  No acute facial fractures.  Nasal bones are intact.  Zygomatic arches are intact.  Orbits are intact the mandible is intact.    Mild mucosal thickening in the maxillary and ethmoid sinuses.    Mastoid air cells are clear.    Small complex 1.9 cm fluid collection laterally on the right superficial to the right masseter and parotid gland on axial 120.  This could represent a small cyst associated with the skin.  Small abscess is not completely excluded.    No significant adenopathy.  Salivary glands appear within normal limits.                                 Medications   iohexoL (OMNIPAQUE 350) injection 100 mL (100 mLs Intravenous Given 3/26/22 5076)     Medical Decision Making:   History:   Old Medical Records: I decided to obtain old medical records.  Initial Assessment:   Firm nontender mass appears superficial, likely benign, but will obtain CT soft tissue to evaluate.  Differential Diagnosis:   Lipoma, cutaneous cyst, parotid mass, other neoplasm,  sialoadenitis, sialolith, no warmth or fluctuance concerning for abscess  Clinical Tests:   Lab Tests: Ordered and Reviewed  Radiological Study: Ordered and Reviewed  ED Management:  CT shows simple cyst superficial to the parotid gland and other structures.  Labs within normal limits.  Patient referred to Plastic surgery.  For follow-up. Stable for d/c, I discussed outpatient follow up and return precautions with pt and answered all questions.            Scribe Attestation:   Scribe #1: I performed the above scribed service and the documentation accurately describes the services I performed. I attest to the accuracy of the note.        ED Course as of 03/28/22 0001   Sun Mar 27, 2022   0037 CBC auto differential(!) [JR]   0037 WBC: 10.98 [JR]   0037 Hemoglobin: 14.4 [JR]   0037 Sodium: 137 [JR]   0037 Potassium: 4.4 [JR]      ED Course User Index  [JR] Elizabeth Trevino MD             Clinical Impression:   Final diagnoses:  [L72.9] Cutaneous cyst (Primary)          ED Disposition Condition    Discharge Stable        ED Prescriptions     None        Follow-up Information     Follow up With Specialties Details Why Contact Info Additional Information    [facility] Emergency Dept Emergency Medicine Go to  As needed, If symptoms worsen [address] [facility] [id number] [phone]     [facility] Plastic Surgery Schedule an appointment as soon as possible for a visit   [address] [facility] [phone] Main Building, 2nd Floor Please park in [facility] and use escalator or Clinic elevator           Elizabeth Trevino MD  03/28/22 0001

## 2022-03-28 LAB — HIV1+2 IGG SERPL QL IA.RAPID: NORMAL

## 2022-06-13 ENCOUNTER — HOSPITAL ENCOUNTER (EMERGENCY)
Facility: HOSPITAL | Age: 37
Discharge: HOME | End: 2022-06-13
Attending: EMERGENCY MEDICINE

## 2022-06-13 VITALS
TEMPERATURE: 101 F | DIASTOLIC BLOOD PRESSURE: 63 MMHG | HEART RATE: 88 BPM | RESPIRATION RATE: 18 BRPM | OXYGEN SATURATION: 96 % | SYSTOLIC BLOOD PRESSURE: 151 MMHG

## 2022-06-13 DIAGNOSIS — U07.1 COVID-19: Primary | ICD-10-CM

## 2022-06-13 LAB
CTP QC/QA: YES
SARS-COV-2 RDRP RESP QL NAA+PROBE: POSITIVE

## 2022-06-13 PROCEDURE — U0002 COVID-19 LAB TEST NON-CDC: HCPCS | Performed by: EMERGENCY MEDICINE

## 2022-06-13 PROCEDURE — 99283 EMERGENCY DEPT VISIT LOW MDM: CPT | Mod: 25

## 2022-06-13 PROCEDURE — 25000003 PHARM REV CODE 250: Performed by: NURSE PRACTITIONER

## 2022-06-13 PROCEDURE — 99283 PR EMERGENCY DEPT VISIT,LEVEL III: ICD-10-PCS | Mod: CS,,, | Performed by: EMERGENCY MEDICINE

## 2022-06-13 PROCEDURE — 99283 EMERGENCY DEPT VISIT LOW MDM: CPT | Mod: CS,,, | Performed by: EMERGENCY MEDICINE

## 2022-06-13 RX ORDER — ACETAMINOPHEN 500 MG
1000 TABLET ORAL
Status: COMPLETED | OUTPATIENT
Start: 2022-06-13 | End: 2022-06-13

## 2022-06-13 RX ADMIN — ACETAMINOPHEN 1000 MG: 500 TABLET ORAL at 10:06

## 2022-06-13 NOTE — Clinical Note
"Hansel"Patrice Puente was seen and treated in our emergency department on 6/13/2022.     COVID-19 is present in our communities across the state. There is limited testing for COVID at this time, so not all patients can be tested. In this situation, your employee meets the following criteria:    Hansel Puente has met the criteria for COVID-19 testing and has a POSITIVE result. He can return to work once they are asymptomatic for 24 hours without the use of fever reducing medications AND at least five days from the first positive result. A mask is recommended for 5 days post quarantine.     If you have any questions or concerns, or if I can be of further assistance, please do not hesitate to contact me.    Sincerely,              ESTHELA"

## 2022-06-14 NOTE — ED PROVIDER NOTES
Encounter Date: 6/13/2022       History     Chief Complaint   Patient presents with    Fever     Reports fever, cough, congestion, and HA that began today.     37 y/o m, h/o obesity, c/o URI sx and fever/chills x 1 day, mild-moderate.  + vaccinated against COVID.  No sob/lópez/cp.  No n/v/d.    The history is provided by the patient.     Review of patient's allergies indicates:  No Known Allergies  No past medical history on file.  No past surgical history on file.  No family history on file.  Social History     Tobacco Use    Smoking status: Current Every Day Smoker     Packs/day: 0.50     Types: Cigarettes    Smokeless tobacco: Never Used   Substance Use Topics    Alcohol use: No     Comment: Socially    Drug use: No     Review of Systems   Constitutional: Positive for chills and fever.   Respiratory: Negative for cough and shortness of breath.    Cardiovascular: Negative for chest pain and leg swelling.       Physical Exam     Initial Vitals [06/13/22 2121]   BP Pulse Resp Temp SpO2   (!) 151/63 88 18 (!) 100.8 °F (38.2 °C) 96 %      MAP       --         Physical Exam    Nursing note and vitals reviewed.      Gen: well-appearing, in NAD  HEENT: MMM  Pulm: Comfortable respirations, no signs of respiratory distress, no increased work of breathing/no significant tachypnea or accessory muscle use  Skin: no rash  Ext: no edema  Neuro: alert, answering questions appropriately, no focal weakness    ED Course   Procedures  Labs Reviewed   SARS-COV-2 RDRP GENE - Abnormal; Notable for the following components:       Result Value    POC Rapid COVID Positive (*)     All other components within normal limits    Narrative:     This test utilizes isothermal nucleic acid amplification   technology to detect the SARS-CoV-2 RdRp nucleic acid segment.   The analytical sensitivity (limit of detection) is 125 genome   equivalents/mL.   A POSITIVE result implies infection with the SARS-CoV-2 virus;   the patient is presumed to be  contagious.     A NEGATIVE result means that SARS-CoV-2 nucleic acids are not   present above the limit of detection. A NEGATIVE result should be   treated as presumptive. It does not rule out the possibility of   COVID-19 and should not be the sole basis for treatment decisions.   If COVID-19 is strongly suspected based on clinical and exposure   history, re-testing using an alternate molecular assay should be   considered.   This test is only for use under the Food and Drug   Administration s Emergency Use Authorization (EUA).   Commercial kits are provided by [employer].   Performance characteristics of the EUA have been independently   verified by [facility] Department of   Pathology and Laboratory Medicine.   _________________________________________________________________   The authorized Fact Sheet for Healthcare Providers and the authorized Fact   Sheet for Patients of the ID NOW COVID-19 are available on the FDA   website:     https://www.fda.gov/media/207354/download  https://www.fda.gov/media/037779/download                  Imaging Results    None          Medications   acetaminophen tablet 1,000 mg (1,000 mg Oral Given 6/13/22 2217)     Medical Decision Making:   History:   Old Medical Records: I decided to obtain old medical records.  Initial Assessment:   This patient has been evaluated during the COVID-19 pandemic and his test is positive for COVID-19.    He is hemodynamically stable, without signs of respiratory distress, nor significant hypoxia, even with ambulation, to necessitate hospitalization at this time.  Patient was counseled regarding isolation measures and strict ED return precautions given.  Enrolled in [facility] home symptom monitoring program.  This patient has a COVID risk score of 2, so would be a candidate for paxlovid and I have rx'd this med.  Discharged home in stable condition.    Clinical Tests:   Lab Tests: Ordered and Reviewed                      Clinical  Impression:   Final diagnoses:  [U07.1] COVID-19 (Primary)          ED Disposition Condition    Discharge Stable        ED Prescriptions     Medication Sig Dispense Start Date End Date Auth. Provider    nirmatrelvir-ritonavir 150 mg x 2- 100 mg copackaged tablets (EUA) Take 3 tablets by mouth 2 (two) times daily for 5 days. Each dose contains 2 nirmatrelvir (pink tablets) and 1 ritonavir (white tablet). Take all 3 tablets together 30 tablet 6/13/2022 6/18/2022 Mayelin John MD        Follow-up Information     Follow up With Specialties Details Why Contact Info    [facility] - Emergency Dept Emergency Medicine  If symptoms worsen [address] [facility] [address] [phone]           Mayelin John MD  06/13/22 2654

## 2022-06-14 NOTE — ED TRIAGE NOTES
Hansel Puente, a 36 y.o. male presents to the ED   Triage note:  Chief Complaint   Patient presents with    Fever     Reports fever, cough, congestion, and HA that began today.     Review of patient's allergies indicates:  No Known Allergies  No past medical history on file.

## 2022-06-14 NOTE — FIRST PROVIDER EVALUATION
Emergency Department TeleTriage Encounter Note      CHIEF COMPLAINT    Chief Complaint   Patient presents with    Fever     Reports fever, cough, congestion, and HA that began today.       VITAL SIGNS   Initial Vitals [06/13/22 2121]   BP Pulse Resp Temp SpO2   (!) 151/63 88 18 (!) 100.8 °F (38.2 °C) 96 %      MAP       --            ALLERGIES    Review of patient's allergies indicates:  No Known Allergies    PROVIDER TRIAGE NOTE  This is a teletriage evaluation of a 36 y.o. male presenting to the ED with c/o fever, cough and congestion x 1 day.     ROS: (+) fever, (-) rash, (-) n/v/d  PE:  NAD    Initial orders will be placed and care will be transferred to an alternate provider when patient is roomed for a full evaluation. Any additional orders and the final disposition will be determined by that provider.         ORDERS  Labs Reviewed   SARS-COV-2 RDRP GENE       ED Orders (720h ago, onward)    Start Ordered     Status Ordering Provider    06/13/22 2215 06/13/22 2213  acetaminophen tablet 1,000 mg  ED 1 Time         Ordered DUARTE SPANN    06/13/22 2124 06/13/22 2123  POCT COVID-19 Rapid Screening  Once         Acknowledged ZIYAD COTTER            Virtual Visit Note: The provider triage portion of this emergency department evaluation and documentation was performed via [employer], a HIPAA-compliant telemedicine application, in concert with a tele-presenter in the room. A face to face patient evaluation with one of my colleagues will occur once the patient is placed in an emergency department room.      DISCLAIMER: This note was prepared with [employer] voice recognition transcription software. Garbled syntax, mangled pronouns, and other bizarre constructions may be attributed to that software system.